# Patient Record
Sex: MALE | Race: WHITE | Employment: FULL TIME | URBAN - METROPOLITAN AREA
[De-identification: names, ages, dates, MRNs, and addresses within clinical notes are randomized per-mention and may not be internally consistent; named-entity substitution may affect disease eponyms.]

---

## 2022-12-09 ENCOUNTER — OFFICE VISIT (OUTPATIENT)
Dept: UROLOGY | Age: 32
End: 2022-12-09

## 2022-12-09 DIAGNOSIS — Z30.09 VASECTOMY EVALUATION: Primary | ICD-10-CM

## 2022-12-09 RX ORDER — DIAZEPAM 10 MG/1
10 TABLET ORAL ONCE
Qty: 1 TABLET | Refills: 0 | Status: SHIPPED | OUTPATIENT
Start: 2022-12-09 | End: 2022-12-09

## 2022-12-09 ASSESSMENT — ENCOUNTER SYMPTOMS
SKIN LESIONS: 0
INDIGESTION: 0
HEARTBURN: 0
ABDOMINAL PAIN: 0
WHEEZING: 0
BACK PAIN: 0
COUGH: 0
SHORTNESS OF BREATH: 0
NAUSEA: 0
EYE DISCHARGE: 0
DIARRHEA: 0
CONSTIPATION: 0
BLOOD IN STOOL: 0
VOMITING: 0
EYE PAIN: 0

## 2022-12-09 NOTE — PROGRESS NOTES
[facility]  [address] [facility] [place] [address], [address] [facility]  [phone]    Magdalene Aguilar  : 1990    Chief Complaint   Patient presents with    Sterilization     Vasectomy Consult          HPI     Magdalene Aguilar is a 29 y.o.  male who presents for vasectomy consultation. He is  with no children. He and his wife do not want children and would like vasectomy for elective sterilization. No LUTS. No history of  surgeries. Not on blood thinners. No issues with testicular pain. Not on blood thinners. History reviewed. No pertinent past medical history. No past surgical history on file. No current outpatient medications on file. No current facility-administered medications for this visit. No Known Allergies  Social History     Socioeconomic History    Marital status:      Spouse name: Not on file    Number of children: Not on file    Years of education: Not on file    Highest education level: Not on file   Occupational History    Not on file   Tobacco Use    Smoking status: Never    Smokeless tobacco: Current     Types: Snuff   Substance and Sexual Activity    Alcohol use: Yes    Drug use: Not on file    Sexual activity: Not on file   Other Topics Concern    Not on file   Social History Narrative    Not on file     Social Determinants of Health     Financial Resource Strain: Not on file   Food Insecurity: Not on file   Transportation Needs: Not on file   Physical Activity: Not on file   Stress: Not on file   Social Connections: Not on file   Intimate Partner Violence: Not on file   Housing Stability: Not on file     No family history on file. Review of Systems  Constitutional:   Negative for fever, chills, appetite change, malaise/fatigue, headaches and weight loss. Skin:  Negative for skin lesions, rash and itching. Eyes:  Negative for visual disturbance, eye pain and eye discharge.   ENT:  Negative for difficulty articulating words, pain swallowing, high frequency hearing loss and dry mouth. Respiratory:  Negative for cough, blood in sputum, shortness of breath and wheezing. Cardiovascular:  Negative for chest pain, hypertension, irregular heartbeat, leg pain, leg swelling, regular rate and rhythm and varicose veins. GI:  Negative for nausea, vomiting, abdominal pain, blood in stool, constipation, diarrhea, indigestion and heartburn. Genitourinary:  Negative for urinary burning, hematuria, flank pain, recurrent UTIs, history of urolithiasis, nocturia, slower stream, straining, urgency, leakage w/ urge, frequent urination, incomplete emptying, erectile dysfunction, testicular pain, sexually transmitted disease, discharge and urethral stricture. Musculoskeletal:  Negative for back pain, bone pain, arthralgias, tenderness, muscle weakness and neck pain. Neurological:  Negative for dizziness, focal weakness, numbness, seizures and tremors. Psychological:  Negative for depression and psychiatric problem. Endocrine:  Negative for cold intolerance, thirst, excessive urination, fatigue and heat intolerance. Hem/Lymphatic:  Negative for easy bleeding, easy bruising and frequent infections. There were no vitals taken for this visit.      GENERAL: No acute distress, Awake, Alert, Oriented X 3, Gait normal  HEENT: Trachea midline, No gross visual or auditory impairments  CARDIAC: regular rate and rhythm  CHEST AND LUNG: Easy work of breathing, clear to auscultation bilaterally, no cyanosis  ABDOMEN: soft, non tender, non-distended, positive bowel sounds, no organomegaly, no palpable masses, no guarding, no rebound tenderness  : Circumcised phallus without abnormality, Testicles descended in scrotum bilaterally and without palpable mass/nodule, non-tender, no edema, no skin erythema, vas deferens palpable bilaterally, no hydrocele, no inguinal hernias, no inguinal lymphadenopathy  SKIN: No rash, no erythema, no lacerations or abrasions, no ecchymosis  NEUROLOGIC: cranial nerves 2-12 grossly intact         Assessment and Plan    ICD-10-CM    1. Vasectomy evaluation  Z30.09         I counseled the patient extensively on the vasectomy procedure today. He was provided with a handout detailing the operative procedure and I explained the procedure to him step by step. Risksb/benefits/alternatives to procedure were reviewed in detail. He understands that he should consider this to be a permanent sterilization procedure and that I recommend he not have this done unless he is 670% certain that he does not desire future children. We discussed that vasectomies can be reversed if he change his mind in the future, but that reversals are not 100% effective and require more extensive reconstruction in the OR with use of an operating microscope. He understands the risks of bleeding, infection, injury to surrounding structures, 1/2000 risk of recannulization of the vas deferens, chronic testicular pain, need for repeat or revision surgery, possible need to complete vasectomy in the operating room if we are unable to complete it in clinic, risks of local anesthesia, hematoma, sperm granuloma. We also reviewed the need to use another form of contraception for 3 months after vasectomy until he has completed at least 20 ejaculations and had a confirmed negative semen analysis in the Urology office. After our discussion, he expressed understanding of all of the above and indicated that he would like to proceed with scheduling a bilateral vasectomy. >50% of today's visit was spent counseling the patient on the above risks/benefits/alternatives to vasectomy. Matthew Uribe M.D.     [facility]  [address]  Phone: [phone]  Fax: [phone]

## 2023-02-06 ENCOUNTER — OFFICE VISIT (OUTPATIENT)
Dept: UROLOGY | Age: 33
End: 2023-02-06

## 2023-02-06 VITALS — HEIGHT: 71 IN | WEIGHT: 230 LBS | BODY MASS INDEX: 32.2 KG/M2

## 2023-02-06 DIAGNOSIS — Z30.2 ENCOUNTER FOR VASECTOMY: Primary | ICD-10-CM

## 2023-02-06 RX ORDER — DIAZEPAM 10 MG/1
TABLET ORAL
COMMUNITY
Start: 2023-01-28

## 2023-02-06 NOTE — Clinical Note
[facility] UROLOGY  [address] [place] [address] Way  [address] [address]  Phone: [phone]  Fax: [phone]    Lili Gabriel MD        February 6, 2023     Patient: Lc Hemphill   YOB: 1990   Date of Visit: 2/6/2023       To Whom It May Concern: It is my medical opinion that Lc Hemphill {Work release (duty restriction):22930}. If you have any questions or concerns, please don't hesitate to call.     Sincerely,        Lili Gabriel MD

## 2023-02-06 NOTE — PROGRESS NOTES
[facility]  [address]  [address] [facility] [address] [facility]  [phone]    Wai Foot  : 1990         HPI   29 y.o., male who presents for vasectomy. History reviewed. No pertinent past medical history. No past surgical history on file. Current Outpatient Medications   Medication Sig Dispense Refill    diazePAM (VALIUM) 10 MG tablet TAKE 1 TABLET BY MOUTH ONCE DAILY FOR 1 DOSE. START 1 HOUR PRIOR TO VASECTOMY. (Patient not taking: Reported on 2023)       No current facility-administered medications for this visit. No Known Allergies  Social History     Socioeconomic History    Marital status:      Spouse name: Not on file    Number of children: Not on file    Years of education: Not on file    Highest education level: Not on file   Occupational History    Not on file   Tobacco Use    Smoking status: Never    Smokeless tobacco: Current     Types: Snuff   Substance and Sexual Activity    Alcohol use: Yes    Drug use: Not on file    Sexual activity: Not on file   Other Topics Concern    Not on file   Social History Narrative    Not on file     Social Determinants of Health     Financial Resource Strain: Not on file   Food Insecurity: Not on file   Transportation Needs: Not on file   Physical Activity: Not on file   Stress: Not on file   Social Connections: Not on file   Intimate Partner Violence: Not on file   Housing Stability: Not on file     No family history on file. Ht 5' 11\" (1.803 m)   Wt 230 lb (104.3 kg)   BMI 32.08 kg/m²       Vasectomy Procedure Note    HPI: The patient is a 29 y.o. male who is here for a Vasectomy procedure. Consent: Patient is in today after giving an informed consent for vasectomy. Pre-op Medications: Valium 10mg PO  Anesthesia: Lidocaine 1% without epinephrine without added sodium bicarbonate    Procedure Details:. The scrotum was palpated with both testes normal in size and position, no masses palpitated.  The scrotum was cleansed with warm betadine and draped in the usual sterile manner. Local anesthesia was infiltrated in anterior raphe of the scrotum. After adequate anesthesia was established, dissecting scissors used to make a midline incision approximately 1 cm in length. Left vas isolated and brought to incision with vas clamp. Vas freed from surrounding tissue using dissecting scissors. Surgical clips X 4 were used to tie off proximal and distal ends of vas. Midportion removed and were not sent to pathology to confirm. Cautery applied to both ends of vas. No bleeding noted and vas ends released to scrotal sac. Right vas isolated and brought to incision with vas clamp. Vas freed from surrounding tissue using dissecting scissors. Surgical clips X 4 were used to tie off proximal and distal ends of vas. Midportion removed and were not sent to pathology to confirm. Cautery applied to both ends of vas. No bleeding noted and vas ends released to scrotal sac. Wound visualized and hemostasis noted. Bulky dressing/Kerlex applied to scrotal area and jock strap applied. Specimen:  None    Condition:  Stable    Complications:  none. Plan:   1. Continue contraception until negative sperm analysis. Semen count in 12 weeks. 2. Warning signs of infection were reviewed. 3. Patient is taken home by wife with written home care instructions. Bedrest X 48 hrs, Ice pack every 3 hours for 24 hrs. Call the clinic if excessive pain, bleeding or swelling. No lifting >10 lbs for 2 weeks. 4. Recommended that the patient use OTC acetaminophen, OTC ibuprofen as needed for pain. 5. Return for wound check in 3 months. ICD-10-CM    1. Encounter for vasectomy  Z30.2             No orders of the defined types were placed in this encounter. Matthew Escalona M.D.     [facility]rology  [address], [address] [facility]  Phone: [phone]  Fax: [phone]

## 2023-05-12 ENCOUNTER — OFFICE VISIT (OUTPATIENT)
Dept: UROLOGY | Age: 33
End: 2023-05-12

## 2023-05-12 DIAGNOSIS — Z98.52 S/P VASECTOMY: Primary | ICD-10-CM

## 2023-05-12 NOTE — PROGRESS NOTES
Semen check #1  Vasectomy by Dr. Odette Valencia on 02/06/23    No sperm on specimen today. Cleared after vasectomy. MA to notify patient. Matthew Valencia M.D.     [facility]  [address]  Phone: [phone]  Fax: [phone]

## 2025-05-12 ENCOUNTER — OFFICE VISIT (OUTPATIENT)
Age: 35
End: 2025-05-12
Payer: COMMERCIAL

## 2025-05-12 DIAGNOSIS — M54.50 LOW BACK PAIN, UNSPECIFIED BACK PAIN LATERALITY, UNSPECIFIED CHRONICITY, UNSPECIFIED WHETHER SCIATICA PRESENT: Primary | ICD-10-CM

## 2025-05-12 DIAGNOSIS — Q76.49 BERTOLOTTI'S SYNDROME: ICD-10-CM

## 2025-05-12 PROCEDURE — 99203 OFFICE O/P NEW LOW 30 MIN: CPT | Performed by: PHYSICIAN ASSISTANT

## 2025-05-12 NOTE — PATIENT INSTRUCTIONS
Bertolotti's Syndrome    Bertolotti's syndrome is characterized by sacralization of the lowest lumbar vertebral body and lumbarization of the uppermost sacral segment. It involves a total or partial unilateral or bilateral fusion of the transverse process of the lowest lumbar vertebra to the sacrum, leading to the formation of a transitional 5th lumbar vertebra. Of importance is that this syndrome will result in a pain generating 4th lumbar disc resulting in a \"sciatic\" type of a pain correlating to the 5th lumbar nerve root. Usually the transitional vertebra will have a \"spatulated\" transverse process on one side resulting in articulation or partial articulation with the sacrum or at time the ilium and in some cases with both. This results in limited / altered motion at the lumbo-sacral articulation. This loss of motion will then be compensated for at segments superior to the transitional vertebra resulting in accelerated degeneration and strain through the L4 disc level which can become symptomatic and inflame the adjacent L5 nerve root resulting in \"sciatic\" or radicular pain patterns. Scoliosis is frequently found to be associated.

## 2025-05-12 NOTE — PROGRESS NOTES
Name: Gonzalo Temple  YOB: 1990  Gender: male  MRN: [id number]    CC: Back Pain (Mid to low back pain)       History of Present Illness  The patient is a 35-year-old male presenting with chronic lumbar pain.    He reports daily discomfort localized to the lower back, with radiation to either hip, more commonly to the right.. Prolonged sitting results in right hip pain, whereas ambulation triggers left hip pain. He describes increased myofascial tightness and knotting predominantly on the right side. The pain does not radiate to the lower extremities; no paresthesia, including numbness and tingling.  Symptoms are exacerbated by prolonged sitting. He is able to perform activities such as yard work and other physical tasks provided he avoids strenuous running. Ambulation is generally manageable unless he experiences awkward sleeping positions.    The symptoms have persisted for approximately 3 to 5 years. The patient has previously consulted a chiropractor and massage therapist.  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 He has not pursued physical therapy due to insurance constraints and the absence of a primary care physician. The patient prefers engaging in stretching exercises over pharmacological interventions.                   5/12/2025     1:46 PM   AMB PAIN ASSESSMENT   Location of Pain Back    Location Modifiers Medial   Severity of Pain 6   Quality of Pain Aching   Duration of Pain Persistent   Frequency of Pain Intermittent   Date Pain First Started 7/16/2021   Limiting Behavior Some   Result of Injury No   Work-Related Injury No   Are there other pain locations you wish to document? No       Significant value              ROS/Meds/PSH/PMH/FH/SH: I personally reviewed the patient's collected intake data.  Below are the pertinents:    No Known Allergies    No current outpatient medications on file.    No past surgical history on

## 2025-05-19 ENCOUNTER — TELEPHONE (OUTPATIENT)
Age: 35
End: 2025-05-19

## 2025-06-09 ENCOUNTER — EVALUATION (OUTPATIENT)
Age: 35
End: 2025-06-09
Payer: COMMERCIAL

## 2025-06-09 DIAGNOSIS — M54.50 CHRONIC BILATERAL LOW BACK PAIN WITHOUT SCIATICA: Primary | ICD-10-CM

## 2025-06-09 DIAGNOSIS — M54.50 LOW BACK PAIN, UNSPECIFIED BACK PAIN LATERALITY, UNSPECIFIED CHRONICITY, UNSPECIFIED WHETHER SCIATICA PRESENT: ICD-10-CM

## 2025-06-09 DIAGNOSIS — M62.81 MUSCLE WEAKNESS (GENERALIZED): ICD-10-CM

## 2025-06-09 DIAGNOSIS — Z74.09 IMPAIRED FUNCTIONAL MOBILITY, BALANCE, GAIT, AND ENDURANCE: ICD-10-CM

## 2025-06-09 DIAGNOSIS — Q76.49 BERTOLOTTI'S SYNDROME: ICD-10-CM

## 2025-06-09 DIAGNOSIS — G89.29 CHRONIC BILATERAL LOW BACK PAIN WITHOUT SCIATICA: Primary | ICD-10-CM

## 2025-06-09 PROCEDURE — 97161 PT EVAL LOW COMPLEX 20 MIN: CPT | Performed by: PHYSICAL THERAPIST

## 2025-06-09 PROCEDURE — 97110 THERAPEUTIC EXERCISES: CPT | Performed by: PHYSICAL THERAPIST

## 2025-06-09 NOTE — PROGRESS NOTES
which may require adjustments to the POC.  Prognosis: good   Benefits and precautions of treatment plan explained to patient. Verbalized and demonstrates understanding.   Gonzalo Temple is a 35 y.o. male who presents to therapy today with evolving/changing clinical presentation (moderate complexity)  related to back pain. Pain does no radiate. A lumbar side bend is noted on the L. No antalgia noted. Pt with weakness to bilateral glut med worse on RIGHT than LEFT.  Decreased lumbar lordosis noted. Patient sacral sits. Patient with anteriorly translated pelvis. Pt would benefit from skilled physical therapy services to address the deficits noted above for return to prior level of function.    PLAN OF CARE   Review and update written HEP next visit. Manual therapy as appropriate. Progress to site specific strengthening/stabilization exercises as tolerated.   Effective Dates/Duration: 6/9/2025 TO 9/7/2025 (90 days).    Frequency:  1-2 per week    Interventions may include but are not limited to:   (16235) Therapeutic exercise to develop ROM, strength, endurance and flexibility  (79271) Therapeutic activities using dynamic activities to improve function  (95885) Manual therapy techniques to improve joint and/or soft tissue mobility, ROM, and function as well as helping to decrease pain/spasms and swelling  (23223) Neuromuscular reeducation addressing impaired balance, coordination, kinesthetic sense, posture and proprioception  (47795) Self-care/home management training to improve activities of daily living skills and compensatory techniques to achieve independence  (32902/20561) Dry needling for the management of neuromusculoskeletal pain and movement impairment  Modalities prn to address pain, spasms, and swelling: (71840/) Electrical stimulation- unattended    The referring medical provider has reviewed and approved this evaluation and plan of care as noted by the electronic signature attached to

## 2025-06-19 ENCOUNTER — TREATMENT (OUTPATIENT)
Age: 35
End: 2025-06-19

## 2025-06-19 DIAGNOSIS — M54.50 CHRONIC BILATERAL LOW BACK PAIN WITHOUT SCIATICA: Primary | ICD-10-CM

## 2025-06-19 DIAGNOSIS — M62.81 MUSCLE WEAKNESS (GENERALIZED): ICD-10-CM

## 2025-06-19 DIAGNOSIS — Z74.09 IMPAIRED FUNCTIONAL MOBILITY, BALANCE, GAIT, AND ENDURANCE: ICD-10-CM

## 2025-06-19 DIAGNOSIS — G89.29 CHRONIC BILATERAL LOW BACK PAIN WITHOUT SCIATICA: Primary | ICD-10-CM

## 2025-06-19 NOTE — PROGRESS NOTES
[facility]THOPAEDICS  [address] [place] [address]  Dept: [phone]      Physical Therapy Daily Note     Referring MD: Daisy Pierce PA-C  Diagnosis:     ICD-10-CM    1. Chronic bilateral low back pain without sciatica  M54.50     G89.29       2. Muscle weakness (generalized)  M62.81       3. Impaired functional mobility, balance, gait, and endurance  Z74.09          Spine Surgery Date (if applicable):   Prior Spine Surgery: None    Prior Orthopedic Surgeries: None  Therapy precautions:None  Co-morbidities affecting plan of care: None    Payor: Payor: [place] [employer] /  /  /     Billing pattern: Commercial- substantial/midpoint time each CPT  Total Timed Procedure Codes: 30 min   Total Time: 40 min    Modifier needed: No  Episode visit count:  2     PERTINENT MEDICAL HISTORY   Medications: reviewed in chart  Past medical and surgical history:   No past medical history on file.   No past surgical history on file.  Allergies: No Known Allergies   Diagnostic exams (per chart review): AP of the lumbar spine there is some leftward rotation.  There is a L6 vertebra with RIGHT  transverse process being sacralized and looks like it is nearly fused to the sacrum the left transverse process is normal transverse process.  Pelvis is slightly rotated. 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       Received previous outpatient therapy? No  Chief complaints/history of injury:  Date symptoms began: 2020  Nature of condition: Chronic (continuous duration > 3 months)  Primary cause of current episode: Unspecified  History of Present Illness: How did symptoms start: Unknown cause. Gradually got worse over time. He reports pain varies day by day. He reports no radiating pain although he gets some radiation mostly after standing or walking too long. Prolonged sitting results

## 2025-06-20 ENCOUNTER — TELEPHONE (OUTPATIENT)
Age: 35
End: 2025-06-20

## 2025-06-25 ENCOUNTER — TREATMENT (OUTPATIENT)
Age: 35
End: 2025-06-25
Payer: COMMERCIAL

## 2025-06-25 DIAGNOSIS — M62.81 MUSCLE WEAKNESS (GENERALIZED): ICD-10-CM

## 2025-06-25 DIAGNOSIS — M54.50 CHRONIC BILATERAL LOW BACK PAIN WITHOUT SCIATICA: Primary | ICD-10-CM

## 2025-06-25 DIAGNOSIS — Z74.09 IMPAIRED FUNCTIONAL MOBILITY, BALANCE, GAIT, AND ENDURANCE: ICD-10-CM

## 2025-06-25 DIAGNOSIS — G89.29 CHRONIC BILATERAL LOW BACK PAIN WITHOUT SCIATICA: Primary | ICD-10-CM

## 2025-06-25 PROCEDURE — 97110 THERAPEUTIC EXERCISES: CPT | Performed by: PHYSICAL THERAPIST

## 2025-06-25 NOTE — PROGRESS NOTES
strategies.      [employer]  Access Code: [email or web address]  URL: [email or web address]  Date: 06/25/2025  Prepared by: Vanessa Mehta    Exercises  - Bird Dog on Counter  - 5 x weekly - 2 sets - 10 reps  - Standing Hip Flexor Stretch  - 1 x daily - 1 sets - 10 reps - 10 hold  - Supine Hip Internal and External Rotation  - 1 x daily - 2 sets - 10 reps - 3 hold  - Supine Piriformis Stretch with Foot on Ground  - 1-2 x daily - 1 sets - 2 reps - 30 hold  - Supine Figure 4 Piriformis Stretch  - 1-2 x daily - 1 sets - 2 reps - 30 hold  - Bridge  - 5 x weekly - 2 sets - 10 reps - 5 hold  - Clamshell with Resistance  - 5 x weekly - 2-3 sets - 10 reps - 3 hold

## 2025-07-02 ENCOUNTER — OFFICE VISIT (OUTPATIENT)
Age: 35
End: 2025-07-02
Payer: COMMERCIAL

## 2025-07-02 DIAGNOSIS — M54.50 CHRONIC BILATERAL LOW BACK PAIN WITHOUT SCIATICA: ICD-10-CM

## 2025-07-02 DIAGNOSIS — G89.29 CHRONIC BILATERAL LOW BACK PAIN WITHOUT SCIATICA: ICD-10-CM

## 2025-07-02 DIAGNOSIS — M54.50 LOW BACK PAIN, UNSPECIFIED BACK PAIN LATERALITY, UNSPECIFIED CHRONICITY, UNSPECIFIED WHETHER SCIATICA PRESENT: ICD-10-CM

## 2025-07-02 DIAGNOSIS — Q76.49 BERTOLOTTI'S SYNDROME: Primary | ICD-10-CM

## 2025-07-02 PROCEDURE — 99213 OFFICE O/P EST LOW 20 MIN: CPT | Performed by: PHYSICIAN ASSISTANT

## 2025-07-02 NOTE — PROGRESS NOTES
Name: Gonzalo Temple  YOB: 1990  Gender: male  MRN: [id number]    CC: Follow-up       History of Present Illness  The patient is a 35-year-old male presenting with chronic lumbar pain.    He reports daily discomfort localized to the lower back, with radiation to either hip, more commonly to the right.. Prolonged sitting results in right hip pain, whereas ambulation triggers left hip pain. He describes increased myofascial tightness and knotting predominantly on the right side. The pain does not radiate to the lower extremities; no paresthesia, including numbness and tingling.  Symptoms are exacerbated by prolonged sitting. He is able to perform activities such as yard work and other physical tasks provided he avoids strenuous running. Ambulation is generally manageable unless he experiences awkward sleeping positions.    The symptoms have persisted for approximately 3 to 5 years. The patient has previously consulted a chiropractor and massage therapist.  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 He has not pursued physical therapy due to insurance constraints and the absence of a primary care physician. The patient prefers engaging in stretching exercises over pharmacological interventions.   7/2/25  Physical therapy and home exercises have been beneficial for maintaining flexibility. Pain is localized to the lower back without radiating to the legs. Despite discomfort, daily activities are not significantly limited. He is not taking any medication for back pain. Frequency of flare-ups has decreased with physical therapy and regular gym workouts.                   5/12/2025     1:46 PM   AMB PAIN ASSESSMENT   Location of Pain Back    Location Modifiers Medial   Severity of Pain 6   Quality of Pain Aching   Duration of Pain Persistent   Frequency of Pain Intermittent   Date Pain First Started 7/16/2021   Limiting Behavior Some   Result of Injury

## 2025-07-18 ENCOUNTER — TREATMENT (OUTPATIENT)
Age: 35
End: 2025-07-18

## 2025-07-18 DIAGNOSIS — G89.29 CHRONIC BILATERAL LOW BACK PAIN WITHOUT SCIATICA: Primary | ICD-10-CM

## 2025-07-18 DIAGNOSIS — M62.81 MUSCLE WEAKNESS (GENERALIZED): ICD-10-CM

## 2025-07-18 DIAGNOSIS — Z74.09 IMPAIRED FUNCTIONAL MOBILITY, BALANCE, GAIT, AND ENDURANCE: ICD-10-CM

## 2025-07-18 DIAGNOSIS — M54.50 CHRONIC BILATERAL LOW BACK PAIN WITHOUT SCIATICA: Primary | ICD-10-CM

## 2025-07-18 NOTE — PROGRESS NOTES
[facility] ORTHOPAEDICS  [address] [facility] [place] [address]  Dept: [phone]      Physical Therapy Discharge     Referring MD: Daisy Pierce PA-C  Diagnosis:     ICD-10-CM    1. Chronic bilateral low back pain without sciatica  M54.50     G89.29       2. Muscle weakness (generalized)  M62.81       3. Impaired functional mobility, balance, gait, and endurance  Z74.09          Spine Surgery Date (if applicable):   Prior Spine Surgery: None    Prior Orthopedic Surgeries: None  Therapy precautions:None  Co-morbidities affecting plan of care: None    Payor: [place] /  /  /     Billing pattern: Commercial- substantial/midpoint time each CPT  Total Timed Procedure Codes: 40 min   Total Time: 40 min    Modifier needed: No  Episode visit count:  4     PERTINENT MEDICAL HISTORY   Medications: reviewed in chart  Past medical and surgical history:   No past medical history on file.   No past surgical history on file.  Allergies: No Known Allergies   Diagnostic exams (per chart review): AP of the lumbar spine there is some leftward rotation.  There is a L6 vertebra with RIGHT  transverse process being sacralized and looks like it is nearly fused to the sacrum the left transverse process is normal transverse process.  Pelvis is slightly rotated. 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     History of Present Illness: How did symptoms start: Unknown cause. Gradually got worse over time. He reports pain varies day by day. He reports no radiating pain although he gets some radiation mostly after standing or walking too long. Prolonged sitting results in right hip pain, whereas ambulation triggers left hip pain. Symptoms do get worse when he bends. He has gone to a chiropractor.   Described current symptoms as: ache, dull, throbbing, sharp, stabbing    SUBJECTIVE   Reports